# Patient Record
Sex: MALE | Race: BLACK OR AFRICAN AMERICAN | Employment: UNEMPLOYED | URBAN - METROPOLITAN AREA
[De-identification: names, ages, dates, MRNs, and addresses within clinical notes are randomized per-mention and may not be internally consistent; named-entity substitution may affect disease eponyms.]

---

## 2019-03-03 ENCOUNTER — HOSPITAL ENCOUNTER (INPATIENT)
Age: 34
LOS: 5 days | Discharge: HOME | DRG: 885 | End: 2019-03-08
Attending: PSYCHIATRY & NEUROLOGY | Admitting: PSYCHIATRY & NEUROLOGY
Payer: COMMERCIAL

## 2019-03-03 PROCEDURE — 65220000003 HC RM SEMIPRIVATE PSYCH

## 2019-03-03 PROCEDURE — 74011250637 HC RX REV CODE- 250/637: Performed by: PSYCHIATRY & NEUROLOGY

## 2019-03-03 RX ORDER — RISPERIDONE 2 MG/1
2 TABLET, FILM COATED ORAL 2 TIMES DAILY
COMMUNITY
End: 2019-03-08

## 2019-03-03 RX ORDER — RISPERIDONE 2 MG/1
2 TABLET, FILM COATED ORAL
Status: DISCONTINUED | OUTPATIENT
Start: 2019-03-03 | End: 2019-03-05

## 2019-03-03 RX ORDER — TRAZODONE HYDROCHLORIDE 50 MG/1
50 TABLET ORAL
Status: DISCONTINUED | OUTPATIENT
Start: 2019-03-03 | End: 2019-03-08 | Stop reason: HOSPADM

## 2019-03-03 RX ORDER — BENZTROPINE MESYLATE 1 MG/ML
1 INJECTION INTRAMUSCULAR; INTRAVENOUS
Status: DISCONTINUED | OUTPATIENT
Start: 2019-03-03 | End: 2019-03-08 | Stop reason: HOSPADM

## 2019-03-03 RX ORDER — BENZTROPINE MESYLATE 1 MG/1
0.5 TABLET ORAL 2 TIMES DAILY
Status: DISCONTINUED | OUTPATIENT
Start: 2019-03-03 | End: 2019-03-08 | Stop reason: HOSPADM

## 2019-03-03 RX ORDER — ESCITALOPRAM OXALATE 10 MG/1
5 TABLET ORAL DAILY
Status: DISCONTINUED | OUTPATIENT
Start: 2019-03-03 | End: 2019-03-04

## 2019-03-03 RX ORDER — RISPERIDONE 1 MG/1
1 TABLET, FILM COATED ORAL DAILY
Status: DISCONTINUED | OUTPATIENT
Start: 2019-03-03 | End: 2019-03-05

## 2019-03-03 RX ORDER — LORAZEPAM 1 MG/1
1 TABLET ORAL
Status: DISCONTINUED | OUTPATIENT
Start: 2019-03-03 | End: 2019-03-08 | Stop reason: HOSPADM

## 2019-03-03 RX ORDER — LORAZEPAM 2 MG/ML
1 INJECTION INTRAMUSCULAR
Status: DISCONTINUED | OUTPATIENT
Start: 2019-03-03 | End: 2019-03-08 | Stop reason: HOSPADM

## 2019-03-03 RX ORDER — ESCITALOPRAM OXALATE 10 MG/1
10 TABLET ORAL DAILY
COMMUNITY
End: 2019-03-08

## 2019-03-03 RX ORDER — BENZTROPINE MESYLATE 0.5 MG/1
0.5 TABLET ORAL 2 TIMES DAILY
COMMUNITY
End: 2019-03-08

## 2019-03-03 RX ORDER — BENZTROPINE MESYLATE 1 MG/1
1 TABLET ORAL
Status: DISCONTINUED | OUTPATIENT
Start: 2019-03-03 | End: 2019-03-08 | Stop reason: HOSPADM

## 2019-03-03 RX ADMIN — ESCITALOPRAM OXALATE 5 MG: 10 TABLET ORAL at 09:25

## 2019-03-03 RX ADMIN — RISPERIDONE 2 MG: 2 TABLET ORAL at 20:31

## 2019-03-03 RX ADMIN — BENZTROPINE MESYLATE 0.5 MG: 1 TABLET ORAL at 20:32

## 2019-03-03 RX ADMIN — BENZTROPINE MESYLATE 0.5 MG: 1 TABLET ORAL at 09:25

## 2019-03-03 RX ADMIN — RISPERIDONE 1 MG: 1 TABLET ORAL at 09:25

## 2019-03-03 RX ADMIN — RISPERIDONE 2 MG: 2 TABLET ORAL at 03:14

## 2019-03-03 NOTE — BH NOTES
Pt arrived at [facility] A/CD unit via wheelchair with his personal belongings. Pt was transferred from [facility] in [place] via medical transport. Pt's belongings were searched for contraband and inventoried. Pt presented depressed and flat. Pt was cooperative during assessment. Pt endorsed recent si and ah saying \"your not wanted\"/\"nobody cares about you\"; pt currently denies si/hi and avh. Pt did contract for safety. Pt stated that he does have a history of depression and anxiety and had  been admitted to inpatient psych in approximately 2005 in [place], [place]. Pt reported 2 previous suicide attempts, recently by drinking kerosene and taking pills and previously by cutting his wrists. Pt reported being \"new\" (Feb/2019) to his home in [address] [place] [address], pt reported moving from [facility] and previous to that he lived in [place], and \"some places in-between\" most notably [facility]. Pt reported being HIV+ and he stated that he hasn't taken his medications Adri Lockett) for HIV in the past 4 years d/t lack of insurance. Pt denies illegal drug and tobacco use. Pt endorsed social ETOH use \"a few times a month I drink a cup\". Pt reported that his friend where he lives at in [place] is good support but pt believes that the place where he lives is not a good place for him to return to after inpt d/t other negative factors which he did not elaborate on. Pt also reported that his sister in [place], [place] and his brother in [place], [place] are good support. Treatment plan was reviewed with pt and was oriented to the unit. Pt verbalized understanding.

## 2019-03-03 NOTE — BH NOTES
Jagruti Leon is not participating in Recreational Therapy. Group time: 3165 Personal goal for participation: fresh air break Goal orientation: social 
 
Group therapy participation: refuse Therapeutic interventions reviewed and discussed: Staff encouraged Pt. To participate in group Impression of participation: Pt refuse, chose to rest in bed despite staff encouragement

## 2019-03-03 NOTE — BH NOTES
Pt had no interacting with peers or staff. Pt has islotated  himself in his room resting only time he came out is for meals. Pt did not have a visitor during this shift. Pt did not fall during this shift. Staff will continue to monitor safety and behavior.

## 2019-03-03 NOTE — BH NOTES
GROUP THERAPY PROGRESS NOTE Daniel Bardales is participating in [place]. Group time: 30 minutes Personal goal for participation: rules/regulations Goal orientation: community Group therapy participation: minimal 
 
Therapeutic interventions reviewed and discussed: He focused on getting help with his stressors. Impression of participation: He was alert and cooperative during group.

## 2019-03-03 NOTE — H&P
[facility] Admission Note Patient:  Santos Montiel Age:  26 y.o. :  1985 SEX:  male MRN:  [id number] [facility]:  [id number] 
 
3/3/2019  11:27 AM 
 
Informants and Patient Contacts: Patient Voluntary: no 
 
Identifying Data and Chief Compliant: \" I tried to comimitt suicide\" \" History of Present Illness: 26year old AA male, with  H/o depression and HIV. Who states that he tried to kill himself by taking on OD and drinking kerosine. He states that he just moved from [facility] on . He claims that his neighbors were harassing him and \" broadcasting all his activities'. He believes that  The local government and police was also in this. He denies any drug use. He was started on Risperdal and Lexapro. He states he last received any HIV or psych treatment since , because of insurance issues. Past Psychiatric/Medical History: 
Previous h/o hospitalization and OD in , after he was diagnosed with HIV. Substance Use History: Alcohol: Alcohol Frequency of Use: 1-3 times in the last month Average Amount Used: 1 cup Allergies: No Known Allergies Prior to admission medications:  
Medications Prior to Admission Medication Sig  
 benztropine (COGENTIN) 0.5 mg tablet Take 0.5 mg by mouth two (2) times a day.  risperiDONE (RISPERDAL) 2 mg tablet Take 2 mg by mouth two (2) times a day.  escitalopram oxalate (LEXAPRO) 10 mg tablet Take 10 mg by mouth daily. Current medications:  
Current Facility-Administered Medications Medication Dose Route Frequency  benztropine (COGENTIN) injection 1 mg  1 mg IntraMUSCular Q6H PRN  
 benztropine (COGENTIN) tablet 1 mg  1 mg Oral Q6H PRN  
 traZODone (DESYREL) tablet 50 mg  50 mg Oral QHS PRN  
 LORazepam (ATIVAN) tablet 1 mg  1 mg Oral Q4H PRN  
 LORazepam (ATIVAN) injection 1 mg  1 mg IntraMUSCular Q4H PRN  
  escitalopram oxalate (LEXAPRO) tablet 5 mg  5 mg Oral DAILY  risperiDONE (RisperDAL) tablet 1 mg  1 mg Oral DAILY  risperiDONE (RisperDAL) tablet 2 mg  2 mg Oral QHS  benztropine (COGENTIN) tablet 0.5 mg  0.5 mg Oral BID Outpatient Physicians:  
 None Review of Systems 
positive for behavior problems Family History of psychiatric and medical illness and substance abuse No family history on file. Personal History He is single, has a no children. Lives with his brother Mental Status Exam 
 
 
Appearance   
General Behavior   Pleasant and cooperative    
Speech form and content, 
Language Associations Form of Thought   Normal flow and volume TP : Logical, goal oriented Mood, Affect Self-Attitude Vital Sense SI/HI/PDW   Depressed No SI, HI, hopelessness Abnormal Perceptions and illusions   AH's  
Delusions   Paranoia Anxiety    Denies COGNITION Intelligence Abstraction   Intact Judgement Insight     Limited Data/Labs/Vital Signs Patient Vitals for the past 24 hrs: 
 BP Temp Pulse Resp  
03/03/19 0910 121/74 98.6 °F (37 °C) 100 18  
03/03/19 0207 114/74 97 °F (36.1 °C) 80 18 No results found for this or any previous visit (from the past 24 hour(s)). Axis I: Major Depression, Rec Axis II: No diagnosis Axis III: No past medical history on file. Axis IV: Problems with primary support group Axis V: 21-30 behavior considerably influenced by delusions or hallucinations OR serious impairment in judgment, communication OR inability to function in almost all areas Recommendations/Plan 
-Admit  
-Psychosocial assessment and substance abuse counseling 
-Somatic evaluation and tx Cos 
-Begin disposition plannining · I certify that this patient's inpatient psychiatric hospital services furnished since the previous certification were, and continue to be, required for treatment that could reasonably be expected to improve the patient's condition, or for diagnostic study, and that the patient continues to need, on a daily basis, active treatment furnished directly by or requiring the supervision of inpatient psychiatric facility personnel. In addition the hospital records show that services furnished were intensive treatment services, admission or related services, or equivalent services.  
 
Chava Justice MD 3/3/2019 11:27 AM

## 2019-03-04 PROBLEM — F33.3 MDD (MAJOR DEPRESSIVE DISORDER), RECURRENT, SEVERE, WITH PSYCHOSIS (HCC): Status: ACTIVE | Noted: 2019-03-04

## 2019-03-04 PROCEDURE — 65220000003 HC RM SEMIPRIVATE PSYCH

## 2019-03-04 PROCEDURE — 74011250637 HC RX REV CODE- 250/637: Performed by: PSYCHIATRY & NEUROLOGY

## 2019-03-04 RX ORDER — ESCITALOPRAM OXALATE 10 MG/1
10 TABLET ORAL DAILY
Status: DISCONTINUED | OUTPATIENT
Start: 2019-03-05 | End: 2019-03-08 | Stop reason: HOSPADM

## 2019-03-04 RX ADMIN — RISPERIDONE 2 MG: 2 TABLET ORAL at 21:37

## 2019-03-04 RX ADMIN — BENZTROPINE MESYLATE 0.5 MG: 1 TABLET ORAL at 08:28

## 2019-03-04 RX ADMIN — BENZTROPINE MESYLATE 0.5 MG: 1 TABLET ORAL at 21:37

## 2019-03-04 RX ADMIN — ESCITALOPRAM OXALATE 5 MG: 10 TABLET ORAL at 08:27

## 2019-03-04 RX ADMIN — RISPERIDONE 1 MG: 1 TABLET ORAL at 08:28

## 2019-03-04 NOTE — BH NOTES
GROUP THERAPY PROGRESS NOTE Maral Helms is participating in Recreational Therapy. Group time: 45 minutes Personal goal for participation: Move on from past circumstances by remaining active and focused on new tasks. Goal orientation: relaxation Group therapy participation: active Therapeutic interventions reviewed and discussed: Patients discussed, ways to cope and move on from stressful and negative circumstances. Impression of participation: Patient was active in group and enjoyed time off the unit outside getting fresh air.

## 2019-03-04 NOTE — PROGRESS NOTES
The pt was seen individually and his case was discussed with staff. In addition his chart was reviewed. A hx of depression that has reoccurred several times, in addition to his having a hx of several suicide attempts and psychiatric admissions, make his overall prognosis based on his described lack of emotional support as rather guarded. During session today we discussed the tx provided in the past, and what appears to be a hx of positive tx response to a combination of risperidone and escitalopram. One tx possibility will be a switch from risperidone to a different atypical, (such as brexipraxole or aripiprazole), that have an approved FDA recognition for antidepressant augmentation. The pt was also advised about the possibility of increasing his escitalopram dose, which is obviously too low at present. In addition he was informed about his being able to qualify for help, since he is HIV positive. (The pt has not received tx for 4-5 years due to lack of insurance coverage?). So will proceed to increase his dose of Lexapro and will discuss with him the other options mentioned above. VS: 122/77, 80, 20, 36.6. Will see the pt again tomorrow morning.

## 2019-03-04 NOTE — BSMART NOTE
SOCIAL WORK GROUP THERAPY PROGRESS NOTE Group Time:  11am 
 
Group Topic:  Coping Skills    C D Issues Group Participation:   
 
Pt moderately involved during group discussion but remained attentive. Discussion included the process of making \"Change\" by answering questions on handout with an emphasis on strengths & weaknesses to support improving one's self esteem. Pt saw main strength as his artistic ability. Affect mostly flat.

## 2019-03-04 NOTE — BH NOTES
GROUP THERAPY PROGRESS NOTE Maral Sawashington is participating in [place]. Group time: 30 minutes Personal goal for participation: discuss daily Tx goal(s); discuss guideline compliance, unit issues and community announcements Goal orientation: personal 
 
Group therapy participation: active

## 2019-03-04 NOTE — BSMART NOTE
ART THERAPY GROUP PROGRESS NOTE PATIENT SCHEDULED FOR GROUP AT: 13:15 
 
ATTENDANCE: Full PARTICIPATION LEVEL: Participates fully in the art process ATTENTION LEVEL: Able to focus on task FOCUS: Anxiety reduction SYMBOLIC & THEMATIC CONTENT AS NOTED IN IMAGERY: He was polite, compliant, and invested in the task at hand. He participated minimally in group discussion.

## 2019-03-04 NOTE — BH NOTES
Pt suspicious, in milieu most of shift; mood (subj) \"up and down\"; affect dull; cooperative, compliant, insight into illness poor, interaction moderate, focused on \"try to be more social and not scared\", no behavioral issues during shift. Endorses AH, denies HI and AVH; involved in no falls this shift - skid resistant footwear utilized and floor kept free of items that could precipitate a fall.

## 2019-03-04 NOTE — BSMART NOTE
SW assessment/Intervention:  Patient is a 26year old [facility] American male who presents with feelings associated with suicide. Patient reports he began feeling sad at the thought of having no one to support him. Patient reports both his parents are  and he has a brother who he is not close to. Patient reports he feels people often talk about his and this causes him stress. He reports neighbors, family and social media have played a big part on his demise. Patient reports he has been having thoughts of suicide for a long period of time. He reports past hospitalizations and several treatment facilities. Patient reports the feelings of suicide are off and on and he is unaware on how to control these feelings. SW encouraged patient to inform treating physician of concerns. SW will continue to monitor patient and will support as needed. ADALBERTO Ghosh,

## 2019-03-05 PROCEDURE — 74011250637 HC RX REV CODE- 250/637: Performed by: PSYCHIATRY & NEUROLOGY

## 2019-03-05 PROCEDURE — 65220000003 HC RM SEMIPRIVATE PSYCH

## 2019-03-05 RX ORDER — ARIPIPRAZOLE 5 MG/1
10 TABLET ORAL DAILY
Status: DISCONTINUED | OUTPATIENT
Start: 2019-03-06 | End: 2019-03-06

## 2019-03-05 RX ORDER — ACETAMINOPHEN 500 MG
500 TABLET ORAL
Status: DISCONTINUED | OUTPATIENT
Start: 2019-03-05 | End: 2019-03-08 | Stop reason: HOSPADM

## 2019-03-05 RX ADMIN — ACETAMINOPHEN 500 MG: 500 TABLET ORAL at 15:11

## 2019-03-05 RX ADMIN — BENZTROPINE MESYLATE 0.5 MG: 1 TABLET ORAL at 08:25

## 2019-03-05 RX ADMIN — ESCITALOPRAM OXALATE 10 MG: 10 TABLET ORAL at 08:27

## 2019-03-05 RX ADMIN — BENZTROPINE MESYLATE 0.5 MG: 1 TABLET ORAL at 20:32

## 2019-03-05 RX ADMIN — RISPERIDONE 1 MG: 1 TABLET ORAL at 08:24

## 2019-03-05 NOTE — PROGRESS NOTES
Problem: Suicide/Homicide (Adult/Pediatric)  Goal: *STG: Remains safe in hospital  AEB pt not harming self or others, daily while in the hospital.   Outcome: Progressing Towards Goal  No attempts to harm self or others   Goal: *STG: Attends activities and groups  AEB pt attending a minimum of 3 groups daily, while in the hospital.   Outcome: Not Progressing Towards Goal  Attending   Goal: *STG/LTG: Complies with medication therapy  AEB pt taking all scheduled medications daily, while in the hospital.   Outcome: Progressing Towards Goal  Compliant   Goal: *STG/LTG:  No longer expresses self destructive or suicidal/homicidal thoughts  AEB pt no longer expressing suicidal ideations for consecutive days prior to discharge. Outcome: Progressing Towards Goal  Denies     Problem: Psychosis  Goal: *STG: Decreased hallucinations  AEB pt experiencing decreased or absent auditory hallucinations for consecutive days prior to discharge. Outcome: Progressing Towards Goal  Denies     Problem: Falls - Risk of  Goal: *Absence of Falls  Document Steven Fall Risk and appropriate interventions in the flowsheet. Pt will be free from falls daily while in the hospital.   Outcome: Progressing Towards Goal  Fall Risk Interventions:            Medication Interventions: Teach patient to arise slowly                  Comments: Pt is pleasant and cooperative with staff. He is participating in groups and compliant with medication. Pt states he does hear voices but not now. He states his medication has helped him. He denies any thoughts of harming self or others. Pt is spontaneously interacting with peers and staff.

## 2019-03-05 NOTE — BSMART NOTE
SW assessment/Intervention:  Patient is a 26year old [facility] American male who presents with feelings associated with suicide. Patient reports he is feeling better. Patient continues to display feelings of sadness. He reports he is not feeling well due to a headache. Patient was encouraged to inform nurse of illness. Patient reports he has completed application for medicaid. SW encouraged patient to attend groups and engage with peers in an appropriate manner. SW Collateral:  SW left message for  Alexander Saez [phone]) to address discharge planning. Patient reports he will return home with his close friend upon discharge. SW will continue to monitor patient during hospitalization. SW will link patient with AIDS access upon discharge.     ADALBERTO Burris,

## 2019-03-05 NOTE — PROGRESS NOTES
Problem: Suicide/Homicide (Adult/Pediatric)  Goal: *STG: Remains safe in hospital  AEB pt not harming self or others, daily while in the hospital.   Outcome: Progressing Towards Goal  Pt will remain safe in hospital daily during this admission. Goal: *STG: Seeks staff when feelings of self harm or harm towards others arise  AEB pt seeking staff, as needed, when feelings of self harm or harm towards others arise. Outcome: Progressing Towards Goal  Pt will seek staff when feelings of self harm or harm towards others arise daily during this admission. Goal: *STG: Attends activities and groups  AEB pt attending a minimum of 3 groups daily, while in the hospital.   Outcome: Progressing Towards Goal  Pt will attend 2-3 activities/groups daily during this admission. Goal: *STG/LTG: Complies with medication therapy  AEB pt taking all scheduled medications daily, while in the hospital.   Outcome: Progressing Towards Goal  Pt will comply with medication therapy daily during this admission. Goal: *STG/LTG:  No longer expresses self destructive or suicidal/homicidal thoughts  AEB pt no longer expressing suicidal ideations for consecutive days prior to discharge. Outcome: Progressing Towards Goal  Pt will no longer express self destructive or suicidal ideations daily during this admission. Problem: Psychosis  Goal: *STG: Decreased hallucinations  AEB pt experiencing decreased or absent auditory hallucinations for consecutive days prior to discharge. Outcome: Progressing Towards Goal  Pt will have decreased hallucinations daily during this admission. Problem: Falls - Risk of  Goal: *Absence of Falls  Document Steven Fall Risk and appropriate interventions in the flowsheet. Pt will be free from falls daily while in the hospital.   Fall Risk Interventions:     Keep room clutter free. Medication Interventions: Teach patient to arise slowly                  Comments: Patient has been calm and cooperative.  He denied suicidal/homicidal ideations and AV hallucinations. He stated \"I'm here because I wanted to hurt myself\". \"I feel like my family hurt me\". He received a visit, no complaints or concerns voiced. He ate dinner, snack and medication compliant. Nursing will continue to provide a safe and supportive environment.

## 2019-03-05 NOTE — PROGRESS NOTES
The pt was seen individually and his case was discussed with staff in Tx Team. [facility] is beginning to feel better. Concerns were nevertheless raised based upon the pt's hx of difficulties with letting others know how is he actually doing. Otherwise, during session we discussed the possibility of a switch to brexipraxole or aripiprazole, both atypicals having an FDA approval not only due to their antipsychotic efectivness but also in addition due to their antidepressant augmentation properties. After discussing both agents, aripiprazole was chosen by the pt. Hence risperidone will be discontinue. A dose of 10 mg every morning was started beginning tomorrow morning. In addition to above, risperidone is well known to develop gynecomastia on males, with increased levels of prolactin, another reason for the change. VS: 119/76, 78, 16, 36.4. In addition, several labs have been requested for the purpose of baseline, including an A1c and a lipid panel. Will also request a TSH. We will also review the tests performed prior to the pt's admission to our facility. If not done, we will order them. Will see the pt again tomorrow.

## 2019-03-05 NOTE — BH NOTES
GROUP THERAPY PROGRESS NOTE    Gail Royal is participating in Recreational Therapy.      Group time: 30 minutes    Personal goal for participation: reading      Goal orientation: relaxation    Group therapy participation: active    Therapeutic interventions reviewed and discussed: great    Impression of participation: fun

## 2019-03-05 NOTE — BSMART NOTE
ART THERAPY GROUP PROGRESS NOTE    PATIENT SCHEDULED FOR GROUP AT: 10:30    ATTENDANCE: Full    PARTICIPATION LEVEL:  Participates fully in the art process    ATTENTION LEVEL : Able to focus on task    FOCUS: Problem-solving    SYMBOLIC & THEMATIC CONTENT AS NOTED IN IMAGERY: He was calm, compliant, and invested in the task at hand. He kept to himself unless directly prompted, was polite, and participated minimally in group discussion.

## 2019-03-06 LAB
CHOLEST SERPL-MCNC: 122 MG/DL
HBA1C MFR BLD: 5.5 % (ref 4.2–5.6)
HDLC SERPL-MCNC: 28 MG/DL (ref 40–60)
HDLC SERPL: 4.4 {RATIO} (ref 0–5)
LDLC SERPL CALC-MCNC: 80.6 MG/DL (ref 0–100)
LIPID PROFILE,FLP: ABNORMAL
TRIGL SERPL-MCNC: 67 MG/DL (ref ?–150)
TSH SERPL DL<=0.05 MIU/L-ACNC: 2.09 UIU/ML (ref 0.36–3.74)
VLDLC SERPL CALC-MCNC: 13.4 MG/DL

## 2019-03-06 PROCEDURE — 83036 HEMOGLOBIN GLYCOSYLATED A1C: CPT

## 2019-03-06 PROCEDURE — 84443 ASSAY THYROID STIM HORMONE: CPT

## 2019-03-06 PROCEDURE — 65220000003 HC RM SEMIPRIVATE PSYCH

## 2019-03-06 PROCEDURE — 74011250637 HC RX REV CODE- 250/637: Performed by: PSYCHIATRY & NEUROLOGY

## 2019-03-06 PROCEDURE — 80061 LIPID PANEL: CPT

## 2019-03-06 PROCEDURE — 36415 COLL VENOUS BLD VENIPUNCTURE: CPT

## 2019-03-06 RX ORDER — ARIPIPRAZOLE 5 MG/1
15 TABLET ORAL DAILY
Status: DISCONTINUED | OUTPATIENT
Start: 2019-03-07 | End: 2019-03-08 | Stop reason: HOSPADM

## 2019-03-06 RX ADMIN — BENZTROPINE MESYLATE 0.5 MG: 1 TABLET ORAL at 08:40

## 2019-03-06 RX ADMIN — ACETAMINOPHEN 500 MG: 500 TABLET ORAL at 08:40

## 2019-03-06 RX ADMIN — ARIPIPRAZOLE 10 MG: 5 TABLET ORAL at 08:40

## 2019-03-06 RX ADMIN — ESCITALOPRAM OXALATE 10 MG: 10 TABLET ORAL at 08:40

## 2019-03-06 RX ADMIN — BENZTROPINE MESYLATE 0.5 MG: 1 TABLET ORAL at 20:44

## 2019-03-06 NOTE — BH NOTES
GROUP THERAPY PROGRESS NOTE    Gail Royal is participating in [place]. Group time: 30 minutes    Personal goal for participation: Discussed Unit Guidelines    Goal orientation: personal    Group therapy participation: minimal    Therapeutic interventions reviewed and discussed: Pt was made aware of needing to make positive changes in life and behavior to be able to maintain being out of hospital and to keep follow up appointments and medication compliance. Impression of participation: Pt attended  And participated. Voiced no major concerns.

## 2019-03-06 NOTE — BH NOTES
Patient ate dinner. Patient did have visitors this shift. Patient did  attend group. Patient read books with other patients. Laughing and enjoying himself. . Patient appeared to happy and friendly. Patient took nighttime medications. Patient safe on the unit. Patient involved in no falls this shift, Skid proof footwear utilized.

## 2019-03-06 NOTE — BSMART NOTE
FLORENTIN assessment/Intervention:  Patient reports with bright mood and effect. He reports he is feeling much better. Continues to report some feelings of sadness. Patient reports upon discharge he will return to his home where he resides with a family friend. It is reported patient has been more social and has engaged in group activities. Patient currently denies SI/HI. Patient denies AVH. FLORENTIN Collateral:  SW made contact with reinvestment  (Mr. Dalton Barksdale) who will visit patient today. He reports he will assist patient with having access to required medications for his HIV status. SW will continue to link patient with necessary resources to assist with medical needs. SW will continue to monitor patient during hospitalization.       ADALBERTO Duckworth,

## 2019-03-06 NOTE — BH NOTES
Patient has been visible in day area isolated to self. He has been compliant with medication and denied SI/HI or AVH at current. All needs assessed. Will continue to monitor and provide support as needed.

## 2019-03-06 NOTE — BSMART NOTE
ART THERAPY GROUP PROGRESS NOTE    PATIENT SCHEDULED FOR GROUP AT: 13:15    ATTENDANCE: Full    PARTICIPATION LEVEL: Participates fully in the art process    ATTENTION LEVEL: Able to focus on task    FOCUS: Goals    SYMBOLIC & THEMATIC CONTENT AS NOTED IN IMAGERY: He remains soft-spoken and some-what timid. He is polite and keeps to himself. He was guarded and offered general responses.

## 2019-03-06 NOTE — PROGRESS NOTES
The pt was seen individually and his case was discussed with stafff in [facility]. Upon exam today, there is evidence of some improvement. The pt has been able to switched to aripiprazole, and is tolerating current dose well. We are hopeful to see antidepressant augmentation in addition to antipsychotic effects with current tx. Otherwise, we will proceed to increase his dose of Abilify to 15 mg daily. Antidepressant tx will be maintained the same. If stable, we are planning on discharge on Friday. Will see the pt again tomorrow.

## 2019-03-06 NOTE — BH NOTES
Treatment team met -     Medical Director:                                __x___present        Psychiatrist:                                        _x____present      Charge nurse:                                     __x___present           MSW:                                                __x___present      :                      __x___present      Nurse Manager:                                  __x___present      Student RNs:                                      _____present      Medical Students:                               _____present      Art Therapist:                                      ___x__present   Clinical Coordinator:                           __x___present   Internal :                      ___x__present        Plan of care discussed and updated as appropriate. Patient can return to his former placement.

## 2019-03-06 NOTE — BH NOTES
Miranda Jeffers is participating in Leisure Activity Group. Group time: 1700    Personal goal for participation:  Decrease Anxiety    Goal orientation: social    Group therapy participation: active    Therapeutic interventions reviewed and discussed:  Staff encouraged to  choos relaxation activities to decrease anxiety while interacting with peers    Impression of participation:  Pt. chose to , play games with peers, color concepcion patterns or watch a movie.

## 2019-03-06 NOTE — BH NOTES
SVEN Note: The above pt has been cooperative the entire shift. He has not been a management problem this shift. He verbally contract for safety this shift. HE participated in all scheduled groups this shift. -A-Problem #! Cont. -P-Maintain a safe and supportive environment.

## 2019-03-06 NOTE — PROGRESS NOTES
conducted Spirituality Group for Miranda Jeffers, who is a 26 y. o.,male. Patients Primary Language is: [place]. According to the patients EMR Jainism Affiliation is: No preference. The reason the Patient came to the hospital is:   Patient Active Problem List    Diagnosis Date Noted    MDD (major depressive disorder), recurrent, severe, with psychosis ([facility] [address] 03/04/2019          The  provided the following Interventions:  Continued the relationship of care and support. Listened empathically. Offered prayer and assurance of continued prayer on patients behalf. Chart reviewed. The following outcomes were achieved:  Patient expressed gratitude for 's visit. Assessment:  There are no further spiritual or Baptist issues which require Spiritual Care Services interventions at this time. Plan:  Chaplains will continue to follow and will provide pastoral care on an as needed/requested basis.  recommends bedside caregivers page  on duty if patient shows signs of acute spiritual or emotional distress.        [address] [facility]   [phone]

## 2019-03-07 PROCEDURE — 74011250637 HC RX REV CODE- 250/637: Performed by: PSYCHIATRY & NEUROLOGY

## 2019-03-07 PROCEDURE — 65220000003 HC RM SEMIPRIVATE PSYCH

## 2019-03-07 RX ADMIN — BENZTROPINE MESYLATE 0.5 MG: 1 TABLET ORAL at 08:27

## 2019-03-07 RX ADMIN — ESCITALOPRAM OXALATE 10 MG: 10 TABLET ORAL at 08:27

## 2019-03-07 RX ADMIN — BENZTROPINE MESYLATE 0.5 MG: 1 TABLET ORAL at 20:47

## 2019-03-07 RX ADMIN — ARIPIPRAZOLE 15 MG: 5 TABLET ORAL at 08:27

## 2019-03-07 NOTE — BH NOTES
GROUP THERAPY PROGRESS NOTE    Gerardo Pelaez is participating in Recreational Therapy.      Group time: 45 minutes    Personal goal for participation: games    Goal orientation: relaxation    Group therapy participation: active    Therapeutic interventions reviewed and discussed:  socialization      Impression of participation: fun

## 2019-03-07 NOTE — BH NOTES
GROUP THERAPY PROGRESS NOTE    Santos Montiel is participating in [place]. Group time: 30 minutes    Personal goal for participation:  Rules/regulations    Goal orientation: community    Group therapy participation: minimal    Therapeutic interventions reviewed and discussed: He was encouraged to talk to anyone if needed    Impression of participation: He was cooperative during group she was undecided about her goal this shift.

## 2019-03-07 NOTE — BSMART NOTE
ART THERAPY GROUP PROGRESS NOTE    PATIENT SCHEDULED FOR GROUP AT: 13:15    ATTENDANCE: Full    PARTICIPATION LEVEL: Participates fully in the art process    ATTENTION LEVEL: Able to focus on task    FOCUS: Support    SYMBOLIC & THEMATIC CONTENT AS NOTED IN IMAGERY: He was calm and compliant. He remains a bit guarded and only speaks when directly prompted. He shared a few of his supports, however responses were general with minimal elaboration.

## 2019-03-07 NOTE — BSMART NOTE
SOCIAL WORK GROUP THERAPY PROGRESS NOTE    Group Time:  11am    Group Topic:  Coping Skills    C D Issues    Group Participation:  . Pt continues to listen during group discussion but no self disclosure. Affect a bit brighter. Looked at the \"Process of setting Goals\", the value of a \"daily\" /  \"weekly\" schedule as tool to build self esteem, sharpen decision making skills and help define one's reality.

## 2019-03-07 NOTE — PROGRESS NOTES
Problem: Suicide/Homicide (Adult/Pediatric)  Goal: *STG: Remains safe in hospital  AEB pt not harming self or others, daily while in the hospital.   Outcome: Progressing Towards Goal  Patient has made no attempt to harm self or others this shift  Goal: *STG/LTG: Complies with medication therapy  AEB pt taking all scheduled medications daily, while in the hospital.   Outcome: Progressing Towards Goal  Patient is compliant with medication administration    Problem: Falls - Risk of  Goal: *Absence of Falls  Document Steven Fall Risk and appropriate interventions in the flowsheet. Pt will be free from falls daily while in the hospital.   Outcome: Progressing Towards Goal  Fall Risk Interventions:     Patient has been free from falls this shift       Medication Interventions: Teach patient to arise slowly                  Comments: Patient denies suicidal/homicidal ideations, audio/visual hallucinations. Attended all assigned groups and therapies today. Compliant with meals and medications. Pleasant and interacting appropriately with peers and staff. Will continue to monitor patient closely for engagement in treatment and effectiveness of therapeutic interventions.

## 2019-03-07 NOTE — PROGRESS NOTES
The pt was seen individually. His case was discussed with NS. Doing better, the pt described very good tx response to current combination of aripiprazole and escitalopram. We will proceed to discontinue tx with benztropine, since chances for the current atypical prescription to create problems with pseudo parkinsonism, are less than with risperidone, which has been obviously discontinue. VS: 119/78, 86, 18, 36.5. Current plans are to discharge tomorrow. The pt currently denies any thoughts of harm, and he is not psychotic. No evidence of neurocognitive changes noticed either. So a discharge will follow if stable when he is examined by the undersigned again tomorrow. Failed to say above, lipid panel results are fairly good. Also his A1c is normal at 5.5%, and TSH is also normal at 2.00. So as stated a discharge tomorrow will very possibly happen.

## 2019-03-07 NOTE — BH NOTES
Pt in day area most of day and participated  in scheduled groups and activities. He was quite verbal on approach with peers and  with staff. Was encouraged to continue with his treatment here and when discharged. Has been medication compliant and  voiced no negative thoughts.

## 2019-03-08 VITALS
SYSTOLIC BLOOD PRESSURE: 131 MMHG | DIASTOLIC BLOOD PRESSURE: 85 MMHG | TEMPERATURE: 98.1 F | RESPIRATION RATE: 20 BRPM | HEART RATE: 85 BPM

## 2019-03-08 PROCEDURE — 74011250637 HC RX REV CODE- 250/637: Performed by: PSYCHIATRY & NEUROLOGY

## 2019-03-08 RX ORDER — ARIPIPRAZOLE 15 MG/1
15 TABLET ORAL DAILY
Qty: 14 TAB | Refills: 0 | Status: SHIPPED | OUTPATIENT
Start: 2019-03-09

## 2019-03-08 RX ORDER — BENZTROPINE MESYLATE 0.5 MG/1
0.5 TABLET ORAL 2 TIMES DAILY
Qty: 28 TAB | Refills: 0 | Status: SHIPPED | OUTPATIENT
Start: 2019-03-08

## 2019-03-08 RX ORDER — ESCITALOPRAM OXALATE 10 MG/1
10 TABLET ORAL DAILY
Qty: 14 TAB | Refills: 0 | Status: SHIPPED | OUTPATIENT
Start: 2019-03-09

## 2019-03-08 RX ADMIN — BENZTROPINE MESYLATE 0.5 MG: 1 TABLET ORAL at 08:19

## 2019-03-08 RX ADMIN — ESCITALOPRAM OXALATE 10 MG: 10 TABLET ORAL at 08:19

## 2019-03-08 RX ADMIN — ARIPIPRAZOLE 15 MG: 5 TABLET ORAL at 08:19

## 2019-03-08 NOTE — BH NOTES
GROUP THERAPY PROGRESS NOTE    Yuki Monroy is participating in [address] and Emotions [employer] Group    Group time: 2913-0414    Personal goal for participation:  How to know When to Seek Treatment for Alcoholism                                                        and Anyone Experiencing Emotional Difficulties. Goal orientation: active    Group therapy participation: fully participated    Therapeutic interventions reviewed and discussed: When people talk about what is going on in their lives it allows them to release some of their pent up stress. To gain knowledge and support from others who have had or are currently experiencing similar issues. Impression of participation:  [employer] reviewed a film, then  discussed the importance of sharing at [facility], help yourself out of depression, leave anxiety behind, and controlling your fears.   Pt. received information  for both programs and shared while in group

## 2019-03-08 NOTE — BSMART NOTE
SOCIAL WORK GROUP THERAPY PROGRESS NOTE    Group Time:  11am    Group Topic:  Coping Skills    C D Issues    Group Participation:      Pt continues to not contribute or self disclose during group discussion but remained attentive as members discussed handout on the role of \"neurotransmitters\" and how they regulate different aspects of one's moods, cognitions & related behavior. Also listened to comments on importance to keep a \"Journal\" for moods, cognitions, behavior & outcome.

## 2019-03-08 NOTE — BH NOTES
GROUP THERAPY PROGRESS NOTE    Asad Salomon is participating in [place].      Group time: 30 minutes    Personal goal for participation: discuss daily Tx goal(s); discuss guideline compliance, unit issues and community announcements                    Goal orientation: personal    Group therapy participation: active

## 2019-03-08 NOTE — BSMART NOTE
SW assessment/Intervention:  Patient is prepared for discharge. SW made contact with reinvestment  (Mr. Orlando Montesinos) to schedule appointments and to address discharge planning. Medications will be provided. Disposition complete.     ADALBERTO Carolina,

## 2019-03-08 NOTE — PROGRESS NOTES
Problem: Suicide/Homicide (Adult/Pediatric)  Goal: *STG: Remains safe in hospital  AEB pt not harming self or others, daily while in the hospital.   Outcome: Progressing Towards Goal  Patient has made no attempt to harm self or others this shift  Goal: *STG: Attends activities and groups  AEB pt attending a minimum of 3 groups daily, while in the hospital.   Outcome: Progressing Towards Goal  Patient attends groups and activities this shift  Goal: *STG/LTG: Complies with medication therapy  AEB pt taking all scheduled medications daily, while in the hospital.   Outcome: Progressing Towards Goal  Patient has been compliant with medication this shift    Comments: Patient Patient denies suicidal/homicidal ideations, audio/visual hallucinations. Attended all assigned groups and therapies today. Compliant with meals and medications. Cooperative with staff requests. Interacts appropriately with staff and peers. Will continue to monitor patient closely for engagement in treatment and effectiveness of therapeutic interventions.

## 2019-03-09 NOTE — DISCHARGE SUMMARY
[facility]    Name:  Zena Osullivan  MR#:   [id number]  :  1985  ACCOUNT #:  [de-identified]  ADMIT DATE:  2019  DISCHARGE DATE:  2019    SIGNIFICANT FINDINGS:  History and physical exam was performed shortly after the patient was admitted to the facility. Attention invited to this document, a place where the patient's reason for hospitalization, the difficulties that he has had with symptoms of depression, the same as with psychotic symptoms (specifically auditory hallucinations ego dystonic even though not commanding) are clearly as stated. For the purpose of this summary, the reader is advised that the patient was transferred to us from [address] [place] [address] after describing to them that he was still being suicidal.  The patient had been admitted to the crisis program after taking an overdose with multiple medications and was initially prescribed with a combination of risperidone and also escitalopram (Lexapro). Not improving, our hospital was consulted for the patient being kindly admitted by the psychiatrist on-call to the care of the undersigned. COURSE IN THE HOSPITALIZATION AND TREATMENT:  During this hospitalization, the patient was admitted to the adult program on a voluntary basis, the place where he remained until he is being discharged today to outpatient treatment. Seen daily, the patient was also strongly encouraged to participate in group therapy sessions, which he had done fairly well throughout. Prescribed with a combination of Lexapro and Risperdal, the decision was made to discontinue the treatment with risperidone as upon admission (2 mg twice a day), and rather prescribing the patient with aripiprazole for not only to help him out with his auditory hallucinations, but also to augment effects of antidepressant therapy. Lexapro was still maintained at the same dose of 10 mg daily with excellent progress being noted.   When seen today, considered to be much improved and stable, and we will proceed to discharge as indicated. It is to be mentioned that during his admission here at [facility], the only labs that we needed to perform included baseline studies in association for treatment with atypical antipsychotics including a lipid panel that showed triglycerides excellent at 67, cholesterol 122, HDL is low at 28 with a cholesterol HDL ratio of 4.4 noted, LDL 80.6 and hemoglobin A1c of 5.5.  TSH normal at 2.09.    CONDITION UPON DISCHARGE:  Not suicidal, not homicidal, not psychotic, not organic, and psychiatrically competent. FINAL DIAGNOSES:  AXIS I:  Major depressive disorder, recurrent, severe with psychotic symptoms. AXIS II:  Noncontributory. AXIS III:  HIV positive status, status post multidrug overdose. DISPOSITION:  The patient is being discharged with [facility] .    The patient is strongly advised to consult with the PCP of his choice for specifically to go to the health department as he is HIV positive and has not received treatment for an extended period of time. Information about HIV treatment was provided to him by his inpatient . PRESCRIPTIONS UPON DISCHARGE:  Two weeks prescriptions will be given to the patient through reinvestment program including Lexapro 10 mg every morning with Abilify 15 mg every day and Cogentin 0.5 mg twice a day. No evidence of medications-related side effects noted upon discharge. PROGNOSIS:  Fair to guarded depending upon the treatment compliance and treatment response.         Alinda Phoenix, MD      [id number]  D:  03/08/2019 11:32  T:  03/09/2019 11:45  JOB #:  [id number]